# Patient Record
Sex: MALE | Race: WHITE | NOT HISPANIC OR LATINO | URBAN - METROPOLITAN AREA
[De-identification: names, ages, dates, MRNs, and addresses within clinical notes are randomized per-mention and may not be internally consistent; named-entity substitution may affect disease eponyms.]

---

## 2017-10-12 ENCOUNTER — FOLLOW UP (OUTPATIENT)
Dept: URBAN - METROPOLITAN AREA CLINIC 26 | Facility: CLINIC | Age: 67
End: 2017-10-12

## 2017-10-12 VITALS — BODY MASS INDEX: 26.04 KG/M2 | WEIGHT: 186 LBS | HEIGHT: 71 IN

## 2017-10-12 DIAGNOSIS — H35.3122: ICD-10-CM

## 2017-10-12 DIAGNOSIS — H35.371: ICD-10-CM

## 2017-10-12 DIAGNOSIS — H43.392: ICD-10-CM

## 2017-10-12 PROCEDURE — 92250 FUNDUS PHOTOGRAPHY W/I&R: CPT

## 2017-10-12 PROCEDURE — 92226 OPHTHALMOSCOPY (SUB): CPT

## 2017-10-12 PROCEDURE — 92134 CPTRZ OPH DX IMG PST SGM RTA: CPT

## 2017-10-12 PROCEDURE — 92014 COMPRE OPH EXAM EST PT 1/>: CPT

## 2017-10-12 RX ORDER — OLOPATADINE HYDROCHLORIDE 2 MG/ML: 1 SOLUTION OPHTHALMIC

## 2017-10-12 ASSESSMENT — VISUAL ACUITY
OS_SC: 20/30+2
OD_SC: 20/25+2

## 2017-10-12 ASSESSMENT — TONOMETRY
OS_IOP_MMHG: 11
OD_IOP_MMHG: 11

## 2018-03-16 NOTE — PATIENT DISCUSSION
Recommended artificial tears to use: 1 drop 4x a day in both eyes. Sample Retaine MGD given. Cont Restasis.

## 2018-10-09 ENCOUNTER — FOLLOW UP (OUTPATIENT)
Dept: URBAN - METROPOLITAN AREA CLINIC 26 | Facility: CLINIC | Age: 68
End: 2018-10-09

## 2018-10-09 VITALS — WEIGHT: 196 LBS | HEIGHT: 71 IN | BODY MASS INDEX: 27.44 KG/M2

## 2018-10-09 DIAGNOSIS — H35.371: ICD-10-CM

## 2018-10-09 DIAGNOSIS — H43.392: ICD-10-CM

## 2018-10-09 DIAGNOSIS — H35.3122: ICD-10-CM

## 2018-10-09 PROCEDURE — 92134 CPTRZ OPH DX IMG PST SGM RTA: CPT

## 2018-10-09 PROCEDURE — 92250 FUNDUS PHOTOGRAPHY W/I&R: CPT

## 2018-10-09 PROCEDURE — 92014 COMPRE OPH EXAM EST PT 1/>: CPT

## 2018-10-09 ASSESSMENT — VISUAL ACUITY
OS_SC: 20/25-2
OD_SC: 20/25-1

## 2018-10-09 ASSESSMENT — TONOMETRY
OS_IOP_MMHG: 12
OD_IOP_MMHG: 11

## 2019-10-08 ENCOUNTER — FOLLOW UP (OUTPATIENT)
Dept: URBAN - METROPOLITAN AREA CLINIC 26 | Facility: CLINIC | Age: 69
End: 2019-10-08

## 2019-10-08 VITALS — WEIGHT: 196 LBS | BODY MASS INDEX: 27.44 KG/M2 | HEIGHT: 71 IN

## 2019-10-08 DIAGNOSIS — H02.836: ICD-10-CM

## 2019-10-08 DIAGNOSIS — Z98.890: ICD-10-CM

## 2019-10-08 DIAGNOSIS — H43.392: ICD-10-CM

## 2019-10-08 DIAGNOSIS — H02.833: ICD-10-CM

## 2019-10-08 DIAGNOSIS — H35.3122: ICD-10-CM

## 2019-10-08 DIAGNOSIS — H35.371: ICD-10-CM

## 2019-10-08 DIAGNOSIS — H04.123: ICD-10-CM

## 2019-10-08 PROCEDURE — 92250 FUNDUS PHOTOGRAPHY W/I&R: CPT

## 2019-10-08 PROCEDURE — 92014 COMPRE OPH EXAM EST PT 1/>: CPT

## 2019-10-08 PROCEDURE — 92134 CPTRZ OPH DX IMG PST SGM RTA: CPT

## 2019-10-08 ASSESSMENT — VISUAL ACUITY
OS_SC: 20/25-1
OD_SC: 20/30-2

## 2019-10-08 ASSESSMENT — TONOMETRY
OD_IOP_MMHG: 09
OS_IOP_MMHG: 11

## 2019-11-11 ENCOUNTER — UNSCHEDULED FOLLOW UP (OUTPATIENT)
Dept: URBAN - METROPOLITAN AREA CLINIC 26 | Facility: CLINIC | Age: 69
End: 2019-11-11

## 2019-11-11 VITALS — DIASTOLIC BLOOD PRESSURE: 76 MMHG | SYSTOLIC BLOOD PRESSURE: 113 MMHG | HEIGHT: 60 IN | HEART RATE: 86 BPM

## 2019-11-11 DIAGNOSIS — H02.836: ICD-10-CM

## 2019-11-11 DIAGNOSIS — H04.123: ICD-10-CM

## 2019-11-11 DIAGNOSIS — Z98.890: ICD-10-CM

## 2019-11-11 DIAGNOSIS — H43.812: ICD-10-CM

## 2019-11-11 DIAGNOSIS — H43.392: ICD-10-CM

## 2019-11-11 DIAGNOSIS — H02.833: ICD-10-CM

## 2019-11-11 DIAGNOSIS — H43.12: ICD-10-CM

## 2019-11-11 DIAGNOSIS — H35.3122: ICD-10-CM

## 2019-11-11 DIAGNOSIS — H33.002: ICD-10-CM

## 2019-11-11 DIAGNOSIS — H35.371: ICD-10-CM

## 2019-11-11 PROCEDURE — 92134 CPTRZ OPH DX IMG PST SGM RTA: CPT

## 2019-11-11 PROCEDURE — 76512 OPH US DX B-SCAN: CPT

## 2019-11-11 PROCEDURE — 92250 FUNDUS PHOTOGRAPHY W/I&R: CPT

## 2019-11-11 PROCEDURE — 92014 COMPRE OPH EXAM EST PT 1/>: CPT

## 2019-11-11 PROCEDURE — 67110 REPAIR DETACHED RETINA: CPT

## 2019-11-11 RX ORDER — TOBRAMYCIN 3 MG/ML: 1 SOLUTION/ DROPS OPHTHALMIC

## 2019-11-11 RX ORDER — PREDNISOLONE ACETATE 10 MG/ML: 1 SUSPENSION/ DROPS OPHTHALMIC

## 2019-11-11 ASSESSMENT — TONOMETRY
OS_IOP_MMHG: 27
OS_IOP_MMHG: 4
OS_IOP_MMHG: 15

## 2019-11-11 ASSESSMENT — VISUAL ACUITY
OS_SC: 20/60+2
OS_PH: 20/50

## 2019-11-12 ENCOUNTER — POST OP-DILATION (OUTPATIENT)
Dept: URBAN - METROPOLITAN AREA CLINIC 26 | Facility: CLINIC | Age: 69
End: 2019-11-12

## 2019-11-12 DIAGNOSIS — H33.002: ICD-10-CM

## 2019-11-12 DIAGNOSIS — Z98.890: ICD-10-CM

## 2019-11-12 PROCEDURE — 99024 POSTOP FOLLOW-UP VISIT: CPT

## 2019-11-12 ASSESSMENT — TONOMETRY: OS_IOP_MMHG: 16

## 2019-11-12 ASSESSMENT — VISUAL ACUITY: OS_SC: 20/200

## 2019-11-14 ENCOUNTER — POST OP-DILATION (OUTPATIENT)
Dept: URBAN - METROPOLITAN AREA CLINIC 26 | Facility: CLINIC | Age: 69
End: 2019-11-14

## 2019-11-14 DIAGNOSIS — H33.002: ICD-10-CM

## 2019-11-14 DIAGNOSIS — Z98.890: ICD-10-CM

## 2019-11-14 DIAGNOSIS — H35.3122: ICD-10-CM

## 2019-11-14 PROCEDURE — 92250 FUNDUS PHOTOGRAPHY W/I&R: CPT

## 2019-11-14 PROCEDURE — 99024 POSTOP FOLLOW-UP VISIT: CPT

## 2019-11-14 ASSESSMENT — TONOMETRY: OS_IOP_MMHG: 11

## 2019-11-14 ASSESSMENT — VISUAL ACUITY: OS_SC: 20/200

## 2019-11-21 ENCOUNTER — POST OP-DILATION (OUTPATIENT)
Dept: URBAN - METROPOLITAN AREA CLINIC 26 | Facility: CLINIC | Age: 69
End: 2019-11-21

## 2019-11-21 DIAGNOSIS — H35.371: ICD-10-CM

## 2019-11-21 DIAGNOSIS — H33.002: ICD-10-CM

## 2019-11-21 DIAGNOSIS — H35.3122: ICD-10-CM

## 2019-11-21 DIAGNOSIS — Z98.890: ICD-10-CM

## 2019-11-21 PROCEDURE — 92134 CPTRZ OPH DX IMG PST SGM RTA: CPT

## 2019-11-21 PROCEDURE — 99024 POSTOP FOLLOW-UP VISIT: CPT

## 2019-11-21 PROCEDURE — 92250 FUNDUS PHOTOGRAPHY W/I&R: CPT

## 2019-11-21 ASSESSMENT — VISUAL ACUITY: OS_SC: 20/100-2

## 2019-11-21 ASSESSMENT — TONOMETRY: OS_IOP_MMHG: 11

## 2019-12-04 ENCOUNTER — POST OP-DILATION (OUTPATIENT)
Dept: URBAN - METROPOLITAN AREA CLINIC 26 | Facility: CLINIC | Age: 69
End: 2019-12-04

## 2019-12-04 DIAGNOSIS — H02.836: ICD-10-CM

## 2019-12-04 DIAGNOSIS — H04.123: ICD-10-CM

## 2019-12-04 DIAGNOSIS — H02.833: ICD-10-CM

## 2019-12-04 DIAGNOSIS — H33.002: ICD-10-CM

## 2019-12-04 DIAGNOSIS — H35.3122: ICD-10-CM

## 2019-12-04 DIAGNOSIS — H35.371: ICD-10-CM

## 2019-12-04 DIAGNOSIS — H43.392: ICD-10-CM

## 2019-12-04 DIAGNOSIS — Z98.890: ICD-10-CM

## 2019-12-04 DIAGNOSIS — H43.12: ICD-10-CM

## 2019-12-04 DIAGNOSIS — H43.812: ICD-10-CM

## 2019-12-04 PROCEDURE — 92250 FUNDUS PHOTOGRAPHY W/I&R: CPT

## 2019-12-04 PROCEDURE — 99024 POSTOP FOLLOW-UP VISIT: CPT

## 2019-12-04 PROCEDURE — 67228 TREATMENT X10SV RETINOPATHY: CPT

## 2019-12-04 PROCEDURE — 92134 CPTRZ OPH DX IMG PST SGM RTA: CPT

## 2019-12-04 ASSESSMENT — VISUAL ACUITY
OS_PH: 20/80+2
OS_SC: 20/80

## 2019-12-04 ASSESSMENT — TONOMETRY: OS_IOP_MMHG: 14

## 2019-12-26 ENCOUNTER — POST OP-DILATION (OUTPATIENT)
Dept: URBAN - METROPOLITAN AREA CLINIC 26 | Facility: CLINIC | Age: 69
End: 2019-12-26

## 2019-12-26 DIAGNOSIS — Z98.890: ICD-10-CM

## 2019-12-26 DIAGNOSIS — H35.371: ICD-10-CM

## 2019-12-26 DIAGNOSIS — H33.002: ICD-10-CM

## 2019-12-26 DIAGNOSIS — H35.3122: ICD-10-CM

## 2019-12-26 PROCEDURE — 92250 FUNDUS PHOTOGRAPHY W/I&R: CPT

## 2019-12-26 PROCEDURE — 99024 POSTOP FOLLOW-UP VISIT: CPT

## 2019-12-26 ASSESSMENT — VISUAL ACUITY: OS_SC: 20/200

## 2019-12-26 ASSESSMENT — TONOMETRY: OS_IOP_MMHG: 11

## 2019-12-27 ENCOUNTER — POST OP-DILATION (OUTPATIENT)
Dept: URBAN - METROPOLITAN AREA CLINIC 26 | Facility: CLINIC | Age: 69
End: 2019-12-27

## 2019-12-27 DIAGNOSIS — H43.812: ICD-10-CM

## 2019-12-27 DIAGNOSIS — H43.392: ICD-10-CM

## 2019-12-27 DIAGNOSIS — H02.836: ICD-10-CM

## 2019-12-27 DIAGNOSIS — Z98.890: ICD-10-CM

## 2019-12-27 DIAGNOSIS — H02.833: ICD-10-CM

## 2019-12-27 DIAGNOSIS — H43.12: ICD-10-CM

## 2019-12-27 DIAGNOSIS — H04.123: ICD-10-CM

## 2019-12-27 DIAGNOSIS — H33.002: ICD-10-CM

## 2019-12-27 DIAGNOSIS — H35.371: ICD-10-CM

## 2019-12-27 DIAGNOSIS — H35.3122: ICD-10-CM

## 2019-12-27 PROCEDURE — 67105 REPAIR DETACHED RETINA PC: CPT

## 2019-12-27 PROCEDURE — 99024 POSTOP FOLLOW-UP VISIT: CPT

## 2019-12-27 ASSESSMENT — VISUAL ACUITY: OS_SC: 20/70

## 2019-12-27 ASSESSMENT — TONOMETRY: OS_IOP_MMHG: 10

## 2019-12-30 ENCOUNTER — POST OP-DILATION (OUTPATIENT)
Dept: URBAN - METROPOLITAN AREA CLINIC 26 | Facility: CLINIC | Age: 69
End: 2019-12-30

## 2019-12-30 DIAGNOSIS — Z98.890: ICD-10-CM

## 2019-12-30 DIAGNOSIS — H43.812: ICD-10-CM

## 2019-12-30 DIAGNOSIS — H43.392: ICD-10-CM

## 2019-12-30 DIAGNOSIS — H02.836: ICD-10-CM

## 2019-12-30 DIAGNOSIS — H02.833: ICD-10-CM

## 2019-12-30 DIAGNOSIS — H35.3122: ICD-10-CM

## 2019-12-30 DIAGNOSIS — H35.371: ICD-10-CM

## 2019-12-30 DIAGNOSIS — H04.123: ICD-10-CM

## 2019-12-30 DIAGNOSIS — H43.12: ICD-10-CM

## 2019-12-30 DIAGNOSIS — H33.002: ICD-10-CM

## 2019-12-30 PROCEDURE — 99024 POSTOP FOLLOW-UP VISIT: CPT

## 2019-12-30 ASSESSMENT — VISUAL ACUITY: OS_SC: 20/400-2

## 2019-12-30 ASSESSMENT — TONOMETRY: OS_IOP_MMHG: 12

## 2020-01-06 ENCOUNTER — POST OP-DILATION (OUTPATIENT)
Dept: URBAN - METROPOLITAN AREA CLINIC 33 | Facility: CLINIC | Age: 70
End: 2020-01-06

## 2020-01-06 VITALS — WEIGHT: 195 LBS | HEIGHT: 71 IN | BODY MASS INDEX: 27.3 KG/M2

## 2020-01-06 DIAGNOSIS — Z98.890: ICD-10-CM

## 2020-01-06 DIAGNOSIS — H33.42: ICD-10-CM

## 2020-01-06 DIAGNOSIS — H35.371: ICD-10-CM

## 2020-01-06 PROCEDURE — 99024 POSTOP FOLLOW-UP VISIT: CPT

## 2020-01-06 PROCEDURE — 92250 FUNDUS PHOTOGRAPHY W/I&R: CPT

## 2020-01-06 ASSESSMENT — TONOMETRY
OS_IOP_MMHG: 11
OD_IOP_MMHG: 13

## 2020-01-06 ASSESSMENT — VISUAL ACUITY
OS_SC: 20/400+1
OD_SC: 20/20-2

## 2020-02-11 ENCOUNTER — PRE-OP VISIT (OUTPATIENT)
Dept: URBAN - METROPOLITAN AREA CLINIC 26 | Facility: CLINIC | Age: 70
End: 2020-02-11

## 2020-02-11 DIAGNOSIS — H43.392: ICD-10-CM

## 2020-02-11 DIAGNOSIS — H33.42: ICD-10-CM

## 2020-02-11 DIAGNOSIS — H43.12: ICD-10-CM

## 2020-02-11 DIAGNOSIS — H35.371: ICD-10-CM

## 2020-02-11 DIAGNOSIS — H35.3122: ICD-10-CM

## 2020-02-11 DIAGNOSIS — H43.812: ICD-10-CM

## 2020-02-11 PROCEDURE — 92014 COMPRE OPH EXAM EST PT 1/>: CPT

## 2020-02-11 ASSESSMENT — VISUAL ACUITY
OS_SC: 20/200-1
OD_SC: 20/20-2

## 2020-02-11 ASSESSMENT — TONOMETRY
OD_IOP_MMHG: 15
OS_IOP_MMHG: 18

## 2020-02-20 ENCOUNTER — SURGERY/PROCEDURE (OUTPATIENT)
Dept: URBAN - METROPOLITAN AREA SURGERY 10 | Facility: SURGERY | Age: 70
End: 2020-02-20

## 2020-02-20 DIAGNOSIS — H33.42: ICD-10-CM

## 2020-02-20 PROCEDURE — 67113 REPAIR RETINAL DETACH CPLX: CPT

## 2020-02-21 ENCOUNTER — POST OP-DILATION (OUTPATIENT)
Dept: URBAN - METROPOLITAN AREA CLINIC 26 | Facility: CLINIC | Age: 70
End: 2020-02-21

## 2020-02-21 DIAGNOSIS — H33.42: ICD-10-CM

## 2020-02-21 DIAGNOSIS — Z98.890: ICD-10-CM

## 2020-02-21 PROCEDURE — 99024 POSTOP FOLLOW-UP VISIT: CPT

## 2020-02-21 ASSESSMENT — VISUAL ACUITY
OS_SC: CF 1FT
OD_SC: 20/25-2

## 2020-02-21 ASSESSMENT — TONOMETRY
OS_IOP_MMHG: 15
OD_IOP_MMHG: 11

## 2020-02-28 ENCOUNTER — POST OP-DILATION (OUTPATIENT)
Dept: URBAN - METROPOLITAN AREA CLINIC 26 | Facility: CLINIC | Age: 70
End: 2020-02-28

## 2020-02-28 DIAGNOSIS — Z98.890: ICD-10-CM

## 2020-02-28 DIAGNOSIS — H33.42: ICD-10-CM

## 2020-02-28 PROCEDURE — 99024 POSTOP FOLLOW-UP VISIT: CPT

## 2020-02-28 ASSESSMENT — VISUAL ACUITY
OS_SC: 20/200-2
OD_SC: 20/20

## 2020-02-28 ASSESSMENT — TONOMETRY
OS_IOP_MMHG: 15
OD_IOP_MMHG: 16

## 2020-03-19 ENCOUNTER — POST OP-DILATION (OUTPATIENT)
Dept: URBAN - METROPOLITAN AREA CLINIC 26 | Facility: CLINIC | Age: 70
End: 2020-03-19

## 2020-03-19 DIAGNOSIS — H35.371: ICD-10-CM

## 2020-03-19 DIAGNOSIS — Z98.890: ICD-10-CM

## 2020-03-19 DIAGNOSIS — H33.42: ICD-10-CM

## 2020-03-19 DIAGNOSIS — H43.12: ICD-10-CM

## 2020-03-19 PROCEDURE — 92250 FUNDUS PHOTOGRAPHY W/I&R: CPT

## 2020-03-19 PROCEDURE — 99024 POSTOP FOLLOW-UP VISIT: CPT

## 2020-03-19 ASSESSMENT — VISUAL ACUITY
OS_SC: 20/400
OD_SC: 20/20

## 2020-03-19 ASSESSMENT — TONOMETRY
OD_IOP_MMHG: 11
OS_IOP_MMHG: 13

## 2020-06-18 ENCOUNTER — FOLLOW UP (OUTPATIENT)
Dept: URBAN - METROPOLITAN AREA CLINIC 26 | Facility: CLINIC | Age: 70
End: 2020-06-18

## 2020-06-18 DIAGNOSIS — H02.833: ICD-10-CM

## 2020-06-18 DIAGNOSIS — H02.836: ICD-10-CM

## 2020-06-18 DIAGNOSIS — H04.123: ICD-10-CM

## 2020-06-18 DIAGNOSIS — Z98.890: ICD-10-CM

## 2020-06-18 DIAGNOSIS — H33.42: ICD-10-CM

## 2020-06-18 DIAGNOSIS — H35.371: ICD-10-CM

## 2020-06-18 DIAGNOSIS — H43.12: ICD-10-CM

## 2020-06-18 DIAGNOSIS — H35.3122: ICD-10-CM

## 2020-06-18 PROCEDURE — 92250 FUNDUS PHOTOGRAPHY W/I&R: CPT

## 2020-06-18 PROCEDURE — 92014 COMPRE OPH EXAM EST PT 1/>: CPT

## 2020-06-18 ASSESSMENT — TONOMETRY
OS_IOP_MMHG: 11
OD_IOP_MMHG: 13

## 2020-06-18 ASSESSMENT — VISUAL ACUITY
OS_SC: 20/200
OD_SC: 20/25

## 2021-01-01 ENCOUNTER — OFFICE VISIT (OUTPATIENT)
Age: 71
End: 2021-01-01

## 2021-05-19 ENCOUNTER — OFFICE VISIT (OUTPATIENT)
Age: 71
End: 2021-05-19

## 2021-06-16 ENCOUNTER — OFFICE VISIT (OUTPATIENT)
Age: 71
End: 2021-06-16

## 2021-12-01 ENCOUNTER — OFFICE VISIT (OUTPATIENT)
Age: 71
End: 2021-12-01

## 2022-01-12 ENCOUNTER — OFFICE VISIT (OUTPATIENT)
Age: 72
End: 2022-01-12

## 2022-01-19 ENCOUNTER — OFFICE VISIT (OUTPATIENT)
Dept: URBAN - METROPOLITAN AREA CLINIC 9 | Facility: CLINIC | Age: 72
End: 2022-01-19

## 2022-01-25 ENCOUNTER — OFFICE VISIT (OUTPATIENT)
Dept: URBAN - METROPOLITAN AREA CLINIC 9 | Facility: CLINIC | Age: 72
End: 2022-01-25

## 2022-04-14 ENCOUNTER — TELEPHONE ENCOUNTER (OUTPATIENT)
Dept: URBAN - METROPOLITAN AREA CLINIC 9 | Facility: CLINIC | Age: 72
End: 2022-04-14

## 2022-06-09 ENCOUNTER — TELEPHONE ENCOUNTER (OUTPATIENT)
Dept: URBAN - METROPOLITAN AREA CLINIC 9 | Facility: CLINIC | Age: 72
End: 2022-06-09

## 2022-07-30 ENCOUNTER — TELEPHONE ENCOUNTER (OUTPATIENT)
Age: 72
End: 2022-07-30

## 2022-07-30 RX ORDER — SODIUM SULFATE, POTASSIUM SULFATE, MAGNESIUM SULFATE 17.5; 3.13; 1.6 G/ML; G/ML; G/ML
1 (ONE) SOLUTION, CONCENTRATE ORAL
Qty: 0 | Refills: 2 | OUTPATIENT
Start: 2022-01-25 | End: 2022-01-26

## 2022-07-31 ENCOUNTER — TELEPHONE ENCOUNTER (OUTPATIENT)
Age: 72
End: 2022-07-31

## 2022-07-31 RX ORDER — SODIUM SULFATE, POTASSIUM SULFATE, MAGNESIUM SULFATE 17.5; 3.13; 1.6 G/ML; G/ML; G/ML
1 (ONE) SOLUTION, CONCENTRATE ORAL
Qty: 0 | Refills: 2 | Status: ACTIVE | COMMUNITY
Start: 2022-01-25